# Patient Record
(demographics unavailable — no encounter records)

---

## 2018-01-04 NOTE — MRI REPORT
EXAM:

MRI LUMBAR SPINE WITHOUT CONTRAST

 

EXAM DATE: 1/4/2018 09:23 AM.

 

CLINICAL HISTORY: Low back pain. Right lower extremity radiculopathy with pain, numbness, and tinglin
g.

 

COMPARISON: None.

 

TECHNIQUE: Multiplanar, multisequence T1-weighted and fluid-sensitive sequences of the lumbar spine f
rom T12 to S1 without contrast. Other: None.

 

FINDINGS: 

Spinal Cord: The conus terminates at L1. Unremarkable appearance of the conus medullaris.

 

Alignment: No scoliosis or spondylolisthesis.

 

Bone Marrow: Five non-rib-bearing lumbar vertebral bodies are assumed. No acute vertebral body height
 loss or displaced pars defect.

 

Disk Levels/Facets:

T12-L1: Unremarkable.

 

L1-L2: Unremarkable.

 

L2-L3: Unremarkable.

 

L3-L4: Minimal broad-based bulge. Mild facet arthropathy. Slight stenosis of the central canal and fo
ramina without evidence for neural impingement.

 

L4-L5: Shallow broad-based intraforaminal disk protrusion on the left. No significant disk space narr
owing. Minimal if any facet arthropathy. Patent central canal and right foramen. Mild left foraminal 
stenosis.

 

L5-S1: Moderate degenerative disk disease. Unremarkable facets. Right posterior paracentral disk extr
usion is present with posterolateral displacement and possible compression of the right S1 nerve. Thi
s disk extrusion protrudes posteriorly 5-6 mm, measures up to 12 mm wide and is 11-12 mm craniocaudal
.

 

Musculature: Normal. No edema or fatty atrophy.

 

Other: None.

 

IMPRESSION: 

1. Probable right S1 nerve root compression from right posterior paracentral disk herniation from the
 L5-S1 disk space.

2. Mild left foraminal stenosis at L4-L5 from shallow broad-based intraforaminal disk protrusion.

 

Comment: The following findings are so common in adults without low back pain that while we report th
eir presence, they must be interpreted with caution and in the context of the clinical situation. (Re
devin Schreiber et al, Spine 2001)

 

Prevalence of findings in patients without low back pain:

Disk degeneration (any evidence): 92%

Disk desiccation/T2 signal loss: 83%

Disk height loss: 56%

Disk bulge: 64%

Disk protrusion: 32%

Annular tear/high intensity zone: 38%

 

[facility]

Referring Provider Line: [phone]

 

SITE ID: [id number]